# Patient Record
Sex: MALE | Race: WHITE
[De-identification: names, ages, dates, MRNs, and addresses within clinical notes are randomized per-mention and may not be internally consistent; named-entity substitution may affect disease eponyms.]

---

## 2019-04-23 ENCOUNTER — HOSPITAL ENCOUNTER (EMERGENCY)
Dept: HOSPITAL 63 - ER | Age: 19
Discharge: HOME | End: 2019-04-23
Payer: COMMERCIAL

## 2019-04-23 VITALS — WEIGHT: 255 LBS | HEIGHT: 72 IN | BODY MASS INDEX: 34.54 KG/M2

## 2019-04-23 VITALS — DIASTOLIC BLOOD PRESSURE: 94 MMHG | SYSTOLIC BLOOD PRESSURE: 161 MMHG
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
## 2019-04-23 DIAGNOSIS — Z88.2: ICD-10-CM
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
## 2019-04-23 DIAGNOSIS — S42.402A: Primary | ICD-10-CM
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
## 2019-04-23 PROCEDURE — 73060 X-RAY EXAM OF HUMERUS: CPT

## 2019-04-23 PROCEDURE — 73090 X-RAY EXAM OF FOREARM: CPT

## 2019-04-23 PROCEDURE — 73080 X-RAY EXAM OF ELBOW: CPT

## 2019-04-23 PROCEDURE — 99284 EMERGENCY DEPT VISIT MOD MDM: CPT

## 2019-04-23 PROCEDURE — 29125 APPL SHORT ARM SPLINT STATIC: CPT

## 2019-04-23 NOTE — ED.ADGEN
Adult General


Chief Complaint


Chief Complaint


".. It was foot ball practice... and I took a hit on extended elbow... and I 

heard a pop... and it been hurting ever since..."





HPI


HPI





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 

above hx and complaints Lt. elbow pain.   Distal neurovascular intact. Does have

swelling and elbow. Extension of elbow increases pain. Patient is right-hand 

dominant. No other health problems. No other injuries.





Patient does vape.  Patient up-to-date with vaccinations.





Review of Systems


Review of Systems





Constitutional: Denies fever or chills []


Eyes: Denies change in visual acuity, redness, or eye pain []


HENT: Denies nasal congestion or sore throat []


Respiratory: Denies cough or shortness of breath []


Cardiovascular: No additional information not addressed in HPI []


GI: Denies abdominal pain, nausea, vomiting, bloody stools or diarrhea []


: Denies dysuria or hematuria []


Musculoskeletal: Denies back pain or joint pain []complaints of left elbow pain


Integument: Denies rash or skin lesions []


Neurologic: Denies headache, focal weakness or sensory changes []


Endocrine: Denies polyuria or polydipsia []





All other systems were reviewed and found to be within normal limits, except as 

documented in this note.





Family History


Family History


Noncontributory





Current Medications


Current Medications





Current Medications








 Medications


  (Trade)  Dose


 Ordered  Sig/Gabriel  Start Time


 Stop Time Status Last Admin


Dose Admin


 


 Acetaminophen


  (Tylenol)  480 mg  1X  ONCE  4/23/19 18:45


 4/23/19 18:46 DC 4/23/19 18:59


480 MG


 


 Ibuprofen


  (Motrin)  600 mg  1X  ONCE  4/23/19 18:45


 4/23/19 18:46 DC 4/23/19 18:59


600 MG











Allergies


Allergies





Allergies








Coded Allergies Type Severity Reaction Last Updated Verified


 


  Sulfa (Sulfonamide Antibiotics) Allergy Unknown  4/23/19 Yes











Physical Exam


Physical Exam





Constitutional: Well developed, well nourished, moderately acute distress, non-

toxic appearance. []


HENT: Normocephalic, atraumatic, bilateral external ears normal, oropharynx 

moist, no oral exudates, nose normal. []


Eyes: PERRLA, EOMI, conjunctiva normal, no discharge. [] 


Neck: Normal range of motion, no tenderness, supple, no stridor. [] 


Cardiovascular:Heart rate regular rhythm, no murmur []


Lungs & Thorax:  Bilateral breath sounds clear to auscultation []


Abdomen: Bowel sounds normal, soft, no tenderness, no masses, no pulsatile 

masses. [] 


Skin: Warm, dry, no erythema, no rash. [] 


Back: No tenderness, no CVA tenderness. [] 


Extremities: No tenderness, no cyanosis, no clubbing, ROM intact, no edema. [] 

Except findings and left elbow per history of present illness


Neurologic: Alert and oriented X 3, normal motor function, normal sensory 

function, no focal deficits noted. []


Psychologic: Affect normal, judgement normal, mood normal. []





Current Patient Data


Vital Signs





                                   Vital Signs








  Date Time  Temp Pulse Resp B/P (MAP) Pulse Ox O2 Delivery O2 Flow Rate FiO2


 


4/23/19 18:21 98.1 98 18  99 Room Air  











EKG


EKG


[]





Radiology/Procedures


Radiology/Procedures


My interpretation of left elbow forearm shows edema. There are 2 vein lines 

versus fractures 1 in ulnar and 1 in radius.[]





Course & Med Decision Making


Course & Med Decision Making


Pertinent Labs and Imaging studies reviewed. (See chart for details).





Distal neurovascular intact post splint and sling. 





Ice, elevation, rest, sling and splint, Tylenol and ibuprofen for pain.   

Follow-up with Dr. Cummings- [phone].  Call in am for follow up.    Return if 

any concerns. 





[]





Final Impression


Final Impression


1.   Lt. Elbow strain and sprain.


2.   Lt  Elbow fx





Dragon Disclaimer


Dragon Disclaimer


This electronic medical record was generated, in whole or in part, using a voice

 recognition dictation system.





Discharge Summary


Visit Information


Final Diagnosis


Problems


Medical Problems:


(1) Elbow disorder


Status: Acute  





(2) Elbow fracture, left


Status: Acute  











Brief Hospital Course


Allergies





                                    Allergies








Coded Allergies Type Severity Reaction Last Updated Verified


 


  Sulfa (Sulfonamide Antibiotics) Allergy Unknown  4/23/19 Yes








Vital Signs





Vital Signs








  Date Time  Temp Pulse Resp B/P (MAP) Pulse Ox O2 Delivery O2 Flow Rate FiO2


 


4/23/19 18:21 98.1 98 18  99 Room Air  








Brief Hospital Course


Mr. Richard  is a 18 old male who presented with Lt. elbow injury.  Sprain 

strain/ vs non-displaced fx.





Discharge Information


Condition at Discharge:  Improved, Stable


Disposition/Orders:  D/C to Home


Dischare Medications





Current Medications


Acetaminophen (Tylenol) 480 mg 1X  ONCE PO  Last administered on 4/23/19at 

18:59; Admin Dose 480 MG;  Start 4/23/19 at 18:45;  Stop 4/23/19 at 18:46;  

Status DC


Ibuprofen (Motrin) 600 mg 1X  ONCE PO  Last administered on 4/23/19at 18:59; 

Admin Dose 600 MG;  Start 4/23/19 at 18:45;  Stop 4/23/19 at 18:46;  Status DC








Dragon Disclaimer


This chart was dictated in whole or in part using Voice Recognition software in 

a busy, high-work load, and often noisy Emergency Department environment.  It 

may contain unintended and wholly unrecognized errors or omissions.











VARINDER TORRES MD           Apr 23, 2019 18:20

## 2019-04-24 NOTE — RAD
Examination: HUMERUS LEFT, FOREARM LEFT, ELBOW LEFT 3V

 

History: Football injury today, left upper arm, forearm and elbow pain

 

Comparison/Correlation: None

 

Findings: 2 views of the left humerus, 3 views of the left elbow and 2 

views of the left forearm were obtained.

 

Joint spaces are normal with no acute fracture or bony destructive change.

There is no fat pad displacement about the elbow to suggest a joint 

effusion. No displaced acute fracture or bony destructive finding. Linear 

lucency involving the proximal radial shaft longitudinally lateral view is

present but is likely artifactual. Soft tissues are unremarkable.

 

 

Impression:

No definite fracture.

 

Electronically signed by: Sergio Lr MD (4/24/2019 8:20 AM) [facility]

## 2019-04-24 NOTE — RAD
Examination: HUMERUS LEFT, FOREARM LEFT, ELBOW LEFT 3V

 

History: Football injury today, left upper arm, forearm and elbow pain

 

Comparison/Correlation: None

 

Findings: 2 views of the left humerus, 3 views of the left elbow and 2 

views of the left forearm were obtained.

 

Joint spaces are normal with no acute fracture or bony destructive change.

There is no fat pad displacement about the elbow to suggest a joint 

effusion. No displaced acute fracture or bony destructive finding. Linear 

lucency involving the proximal radial shaft longitudinally lateral view is

present but is likely artifactual. Soft tissues are unremarkable.

 

 

Impression:

No definite fracture.

 

Electronically signed by: Sergio Lr MD (4/24/2019 8:20 AM) [facility]

## 2019-04-24 NOTE — RAD
Examination: HUMERUS LEFT, FOREARM LEFT, ELBOW LEFT 3V

 

History: Football injury today, left upper arm, forearm and elbow pain

 

Comparison/Correlation: None

 

Findings: 2 views of the left humerus, 3 views of the left elbow and 2 

views of the left forearm were obtained.

 

Joint spaces are normal with no acute fracture or bony destructive change.

There is no fat pad displacement about the elbow to suggest a joint 

effusion. No displaced acute fracture or bony destructive finding. Linear 

lucency involving the proximal radial shaft longitudinally lateral view is

present but is likely artifactual. Soft tissues are unremarkable.

 

 

Impression:

No definite fracture.

 

Electronically signed by: Sergio Lr MD (4/24/2019 8:20 AM) [facility]